# Patient Record
Sex: MALE | Race: WHITE | NOT HISPANIC OR LATINO | Employment: UNEMPLOYED | URBAN - METROPOLITAN AREA
[De-identification: names, ages, dates, MRNs, and addresses within clinical notes are randomized per-mention and may not be internally consistent; named-entity substitution may affect disease eponyms.]

---

## 2023-04-01 ENCOUNTER — APPOINTMENT (OUTPATIENT)
Dept: RADIOLOGY | Facility: HOSPITAL | Age: 33
End: 2023-04-01
Attending: EMERGENCY MEDICINE
Payer: COMMERCIAL

## 2023-04-01 ENCOUNTER — HOSPITAL ENCOUNTER (EMERGENCY)
Facility: HOSPITAL | Age: 33
Discharge: HOME | End: 2023-04-01
Attending: EMERGENCY MEDICINE | Admitting: EMERGENCY MEDICINE
Payer: COMMERCIAL

## 2023-04-01 VITALS
SYSTOLIC BLOOD PRESSURE: 139 MMHG | HEIGHT: 69 IN | WEIGHT: 155 LBS | BODY MASS INDEX: 22.96 KG/M2 | TEMPERATURE: 97.8 F | OXYGEN SATURATION: 98 % | DIASTOLIC BLOOD PRESSURE: 79 MMHG | RESPIRATION RATE: 18 BRPM | HEART RATE: 100 BPM

## 2023-04-01 DIAGNOSIS — F10.920 ALCOHOLIC INTOXICATION WITHOUT COMPLICATION (H): ICD-10-CM

## 2023-04-01 DIAGNOSIS — R45.851 SUICIDAL IDEATION: ICD-10-CM

## 2023-04-01 DIAGNOSIS — S61.212A LACERATION OF RIGHT MIDDLE FINGER WITHOUT FOREIGN BODY WITHOUT DAMAGE TO NAIL, INITIAL ENCOUNTER: ICD-10-CM

## 2023-04-01 LAB
ETHANOL SERPL-MCNC: 0.15 G/DL
HOLD SPECIMEN: NORMAL

## 2023-04-01 PROCEDURE — 12001 RPR S/N/AX/GEN/TRNK 2.5CM/<: CPT

## 2023-04-01 PROCEDURE — 90715 TDAP VACCINE 7 YRS/> IM: CPT | Performed by: EMERGENCY MEDICINE

## 2023-04-01 PROCEDURE — 36415 COLL VENOUS BLD VENIPUNCTURE: CPT | Performed by: EMERGENCY MEDICINE

## 2023-04-01 PROCEDURE — 73130 X-RAY EXAM OF HAND: CPT | Mod: RT

## 2023-04-01 PROCEDURE — 99285 EMERGENCY DEPT VISIT HI MDM: CPT | Mod: 25

## 2023-04-01 PROCEDURE — 90791 PSYCH DIAGNOSTIC EVALUATION: CPT

## 2023-04-01 PROCEDURE — 90471 IMMUNIZATION ADMIN: CPT | Performed by: EMERGENCY MEDICINE

## 2023-04-01 PROCEDURE — 82077 ASSAY SPEC XCP UR&BREATH IA: CPT | Performed by: EMERGENCY MEDICINE

## 2023-04-01 PROCEDURE — 250N000011 HC RX IP 250 OP 636: Performed by: EMERGENCY MEDICINE

## 2023-04-01 RX ADMIN — CLOSTRIDIUM TETANI TOXOID ANTIGEN (FORMALDEHYDE INACTIVATED), CORYNEBACTERIUM DIPHTHERIAE TOXOID ANTIGEN (FORMALDEHYDE INACTIVATED), BORDETELLA PERTUSSIS TOXOID ANTIGEN (GLUTARALDEHYDE INACTIVATED), BORDETELLA PERTUSSIS FILAMENTOUS HEMAGGLUTININ ANTIGEN (FORMALDEHYDE INACTIVATED), BORDETELLA PERTUSSIS PERTACTIN ANTIGEN, AND BORDETELLA PERTUSSIS FIMBRIAE 2/3 ANTIGEN 0.5 ML: 5; 2; 2.5; 5; 3; 5 INJECTION, SUSPENSION INTRAMUSCULAR at 12:04

## 2023-04-01 ASSESSMENT — COLUMBIA-SUICIDE SEVERITY RATING SCALE - C-SSRS
2. HAVE YOU ACTUALLY HAD ANY THOUGHTS OF KILLING YOURSELF?: YES
1. HAVE YOU WISHED YOU WERE DEAD OR WISHED YOU COULD GO TO SLEEP AND NOT WAKE UP?: YES
REASONS FOR IDEATION LIFETIME: EQUALLY TO GET ATTENTION, REVENGE, OR A REACTION FROM OTHERS AND TO END/STOP THE PAIN
6. HAVE YOU EVER DONE ANYTHING, STARTED TO DO ANYTHING, OR PREPARED TO DO ANYTHING TO END YOUR LIFE?: NO
5. HAVE YOU STARTED TO WORK OUT OR WORKED OUT THE DETAILS OF HOW TO KILL YOURSELF? DO YOU INTEND TO CARRY OUT THIS PLAN?: NO
3. HAVE YOU BEEN THINKING ABOUT HOW YOU MIGHT KILL YOURSELF?: NO
2. HAVE YOU ACTUALLY HAD ANY THOUGHTS OF KILLING YOURSELF?: YES
1. IN THE PAST MONTH, HAVE YOU WISHED YOU WERE DEAD OR WISHED YOU COULD GO TO SLEEP AND NOT WAKE UP?: YES
REASONS FOR IDEATION PAST MONTH: EQUALLY TO GET ATTENTION, REVENGE, OR A REACTION FROM OTHERS AND TO END/STOP THE PAIN
4. HAVE YOU HAD THESE THOUGHTS AND HAD SOME INTENTION OF ACTING ON THEM?: NO
ATTEMPT LIFETIME: NO
TOTAL  NUMBER OF ABORTED OR SELF INTERRUPTED ATTEMPTS LIFETIME: NO
TOTAL  NUMBER OF INTERRUPTED ATTEMPTS LIFETIME: NO
4. HAVE YOU HAD THESE THOUGHTS AND HAD SOME INTENTION OF ACTING ON THEM?: NO
5. HAVE YOU STARTED TO WORK OUT OR WORKED OUT THE DETAILS OF HOW TO KILL YOURSELF? DO YOU INTEND TO CARRY OUT THIS PLAN?: NO

## 2023-04-01 ASSESSMENT — ENCOUNTER SYMPTOMS
HALLUCINATIONS: 0
WOUND: 1

## 2023-04-01 ASSESSMENT — ACTIVITIES OF DAILY LIVING (ADL)
ADLS_ACUITY_SCORE: 35
ADLS_ACUITY_SCORE: 35

## 2023-04-01 NOTE — ED PROVIDER NOTES
"EMERGENCY DEPARTMENT ENCOUNTER      NAME: Alejandro Barnes  AGE: 33 year old male  YOB: 1990  MRN: [id number]  EVALUATION DATE & TIME: 4/1/2023  7:21 AM    PCP: No primary care provider on file.    ED PROVIDER: Rosie Jiménez M.D.      CHIEF COMPLAINT     Chief Complaint   Patient presents with     Suicidal     With no specific plan         FINAL IMPRESSION:     1. Suicidal ideation    2. Alcoholic intoxication without complication (H)    3. Laceration of right middle finger without foreign body without damage to nail, initial encounter          MEDICAL DECISION MAKING:       Pertinent Labs & Imaging studies reviewed. (See chart for details)    33 year old male presents to the Emergency Department for evaluation of suicidal cut to the right hand    ED Course as of 04/01/23 1156   Sat Apr 01, 2023   0806 Mr. Barnes is a 33-year-old male who presents via EMS.  Per EMS and patient's report he was drinking at his father's house the father remove the alcohol bottle and \"\" he started freaking out.  He he hi a statue shield and cut his fingers.  He voiced desire to kill himself.   0807 On evaluation patient is awake and alert.  Denies being suicidal denies having a plan.  States he had thought about it in the past but never carry on.  Denies being evaluated for this in the past.  Does have a history of depression does not take any medications.   0807 On evaluation patient admitted to abusing alcohol but does not think he has an problem.  Denies previous history of alcohol withdrawal or DTs.   0808 On examination well-appearing cooperative and pleasant makes eye contact.  No signs of head trauma.  Cardiopulmonary abnormalities.  2 superficial abrasions middle and fifth finger  Full range of motion.   0808 Plan to x-ray for foreign body fracture.  We will clean the area.  Will Dermabond.  Will obtain collaborating history from father as per DEC   0955 X-ray no foreign body no fracture.   0955 Called father " x2 with no answer.   1118 Spoke with DEC.  She was able to talk to patient and father.  Patient is not actively suicidal.  Resources were given to him.  Father feels comfortable with him going home.  Firearms are locked.  I spoke with father.  He voiced frustration about his son's alcohol drinking.  He feels comfortable with patient going back home.  He cannot come pick patient up because of this note.  States he will be glad for patient to go back home.   1143 Patient has remained suicidal.  Contracts for safety.  Unfortunately when I ask him if he will follow-up with the resources provided for his drinking he said he may.  He does contract in case he does feel feeling suicidal and he will call 911.  Father unable to pick him up therefore we will discharge him with hospital provider transfer.   1144 Clinical impression and decision making  33-year-old male presents here via EMS after he voiced suicidal deviation to his father.  Per EMS patient and the father the father remove an alcohol bottle patient became upset hit his hand on a statue and voiced that he wanted to kill himself.   1145 On arrival patient is not actively suicidal.  He said he just became upset.  States he had thoughts in the past but is not actively suicidal and does not have a plan.   1145 Elevated alcohol level initial evaluation.  Patient noted to be clinically sober after discharge.   1145 Tdap updated.  X-ray reveals no fracture or foreign body.  Laceration repaired with Dermabond.   1145 Patient contracted for safety but is not sure if he is going to follow-up for alcohol abuse.  This is unfortunate because this is what he needs.  I recommended admission to detox center patient refuses.   1146 Discharge clinically sober in stable condition.       Vital Signs: Reviewed  EKG: None  Imaging: An x-ray no acute  Home Meds: Reviewed  ED meds/abx: Tdap, Dermabond  Fluids: Oral    Labs  none      Medical Decision Making    History:    Supplemental  history from: Documented in chart, if applicable    External Record(s) reviewed: Documented in chart, if applicable.    Work Up:    Chart documentation includes differential considered and any EKGs or imaging independently interpreted by provider, where specified.    In additional to work up documented, I considered the following work up: Documented in chart, if applicable.    External consultation:    Discussion of management with another provider: Documented in chart, if applicable    Complicating factors:    Care impacted by chronic illness: Smoking / Nicotine Use    Care affected by social determinants of health: Alcohol Abuse and/or Recreational Drug Use    Disposition considerations: Discharge. No recommendations on prescription strength medication(s). I recommended admission, but the patient declined.          Review of Previous Records  No prior records for review      Consults  DEC Consult      ED COURSE     7:48 AM Introduced myself to the patient, obtained history of present illness, and performed initial physical exam at this time. PPE: Provider wore gloves, N95 mask, eye protection, surgical cap.     9:24 AM Called the patient's father, Heath. He did not answer the phone at this time.     9:54 AM Attempted to call the patient's father again, left a message.     10:31 AM Performed laceration repair with Dermabond.    10:40 AM Spoke with DEC. They were able to speak with the patient and his father today.    11:11 AM Called the patient's father to discuss patient discharge and pick-up.    11:19 AM Discussed discharge with the patient, he is agreeable with this plan.    At the conclusion of the encounter I discussed the results of all of the tests and the disposition. The questions were answered. The patient and his father acknowledged understanding and was agreeable with the care plan.           MEDICATIONS GIVEN IN THE EMERGENCY:     Medications   Tdap (tetanus-diphtheria-acell pertussis) (ADACEL)  "injection 0.5 mL (has no administration in time range)       NEW PRESCRIPTIONS STARTED AT TODAY'S ER VISIT     New Prescriptions    No medications on file          =================================================================    HPI     Patient information was obtained from: the patient    Use of : N/A      Alejandro Jesús Barnes is a 33 year old male with a verbal medical history of depression, who presents by EMS for evaluation of suicidal ideation.     The patient lives at home with his father. He was drinking last night and went to take a shower, when he came back his father had taken the bottle of alcohol. This caused the patient to \"freak out,\" kicking a door and punching a gambino that they have in their home. This injured his right third and ring fingers in the process. He notes that he said something regarding suicide, but denies wanting to kill himself right now. He endorses suicidal ideation, but has no plan. He has a history of suicidal ideation, but has never attempted. He denies having assistance for suicidal plans. Patient endorses depression, but is not currently on medication for this. No history of medical problems. Last tetanus was over 10 years ago, he is open to updating it today. The patient states that he feels safe at home. He denies current suicidal ideation, homicidal ideation, hallucinations, and any other symptoms at this time.     The patient notes that he does not think he has a problem with drinking, but notes that he has trouble stopping once he starts. He usually drinks whiskey and drinks about 0.5 L at a time. No history of alcohol withdrawal.       REVIEW OF SYSTEMS   Review of Systems   Skin: Positive for wound (lacerations of right hand third and fourth finger).   Psychiatric/Behavioral: Positive for suicidal ideas (resolved). Negative for hallucinations.        Negative for homicidal ideation   All other systems reviewed and are negative.       PAST MEDICAL HISTORY: " "  No past medical history on file.    PAST SURGICAL HISTORY:   No past surgical history on file.      CURRENT MEDICATIONS:   No current outpatient medications on file.       ALLERGIES:   No Known Allergies    FAMILY HISTORY:   No family history on file.    SOCIAL HISTORY:     Social History     Socioeconomic History     Marital status: Single       VITALS:   /79 (BP Location: Left arm)   Pulse 100   Temp 97.8  F (36.6  C) (Oral)   Resp 18   Ht 1.753 m (5' 9\")   Wt 70.3 kg (155 lb)   SpO2 98%   BMI 22.89 kg/m      PHYSICAL EXAM     Physical Exam  Vitals and nursing note reviewed. Exam conducted with a chaperone present.   Constitutional:       General: He is not in acute distress.     Appearance: Normal appearance. He is not ill-appearing, toxic-appearing or diaphoretic.   Musculoskeletal:        Arms:    Skin:     Capillary Refill: Capillary refill takes less than 2 seconds.   Neurological:      Mental Status: He is alert and oriented to person, place, and time.   Psychiatric:         Mood and Affect: Mood normal.         Behavior: Behavior normal.         Thought Content: Thought content normal.         Judgment: Judgment normal.      Comments: Not suicidal not homicidal admits to abusing alcohol but declines having a problem with a declines help         Physical Exam   Constitutional: Well-appearing     Head: Atraumatic.     Nose: Nose normal.     Mouth/Throat: Oropharynx is clear and moist.     Eyes: EOM are normal. Pupils are equal, round, and reactive to light.     Ears: bilateral pearly white TMs    Neck: Normal range of motion. Neck supple.     Cardiovascular: Normal rate, regular rhythm and normal heart sounds.      Pulmonary/Chest: Normal effort and breath sounds normal.     Abdominal: soft nontender    Musculoskeletal: Normal range of motion.     Neurological: No deficits    Lymphatics: No edema    : N/A    Skin: Skin is warm and dry. 2.5 cm lacerations of MCP of right third and ring " fingers    Psychiatric: Normal mood and affect. Behavior is normal.  Denies feeling suicidal denies feeling homicidal denies hallucinating admits to drinking alcohol.  Denies having problem with alcoholism.      LAB:     All pertinent labs reviewed and interpreted.  Labs Ordered and Resulted from Time of ED Arrival to Time of ED Departure   ETHYL ALCOHOL LEVEL - Abnormal       Result Value    Alcohol ethyl 0.15 (*)         RADIOLOGY:     Reviewed all pertinent imaging. Please see official radiology report.  XR Hand Right G/E 3 Views   Final Result   IMPRESSION: Normal joint spaces and alignment. No fracture.           EKG:       I have independently reviewed and interpreted the EKG(s) documented above.      PROCEDURES:     Procedures    PROCEDURE: Laceration Repair   INDICATIONS: Laceration   PROCEDURE PROVIDER: Dr Rosie Jiménez   SITE: Right third and ring fingers   TYPE/SIZE: simple, clean and no foreign body visualized  2.5 cm (total length)   FUNCTIONAL ASSESSMENT: Distal sensation, circulation and motor intact   MEDICATION:    PREPARATION: irrigation with Normal saline   DEBRIDEMENT: no debridement           I, Traci Hylton, am serving as a scribe to document services personally performed by Dr. Jiménez based on my observation and the provider's statements to me. I, Rosie Jiménez MD attest that Traci Hylton is acting in a scribe capacity, has observed my performance of the services and has documented them in accordance with my direction.    Rosie Jiménez M.D.  Emergency Medicine  [facility] EMERGENCY DEPARTMENT  [address] [place] [id number] [phone]  Dept: [phone]       Rosie Jiménez MD  04/01/23 2367
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
## 2023-04-01 NOTE — DISCHARGE INSTRUCTIONS
Read and follow the discharge instructions.      Avoid alcohol this is not good for you.  Follow-up with the resources provided.    I am doing a referral for primary care doctor.  They should be calling you for follow-up    Keep your wound clean and dry.  The glue will dissolve on its own.    Return immediately or call 911 if you feel like hurting yourself or hurting others or any other concerns.      Plan  You were seen in the ED due to alcohol intoxication and suicidal statements. We strongly recommend follow up - it can be very helpful to keep talking to someone about how you are feeling, even if you're not sure what you need or what can change.    You declined to schedule any follow up appointments at this time. Please call Behavioral Access Scheduling office at [phone] to set up a virtual Diagnostic Assessment appointment if you change your mind and want to work on your alcohol use and mental health.       If I am feeling unsafe or I am in a crisis, I will:   Contact my established care providers   Call the National Suicide Prevention Lifeline: 988  Go to the nearest emergency room   Call 911     Warning signs that I or other people might notice when a crisis is developing for me: suicidal statements, threats to dad.     Things I am able to do on my own to cope or help me feel better: limit substance use. Working/independence/finances will help you meet your goals     Things that I am able to do with others to cope or help me better: consider trying therapy. If you are not able to work and don't have a good mood - that is a sign to talk to someone!     Things I can use or do for distraction: video games, being outdoors     Changes I can make to support my mental health and wellness: structure each day and abstain from alcohol use     People in my life that I can ask for help: Your dad is supportive, provides safe place to live.     Your [facility] has a mental health crisis team you can call 24/7: Jarek  "[facility] Crisis  [phone] (adults)  [phone] (children)          Crisis Lines  Crisis Text Line  Text 487805  You will be connected with a trained live crisis counselor to provide support.    Por arianne, texto  JACOB a 005728 o texto a 442-AYUDAME en WhatsApp    [employer] [place] [employer] (LGBTQ Youth Crisis Line)  4.693.917.1163  text START to 757-510      InstantQ  Fast Tracker  Linking people to mental health and substance use disorder resources  [place] [employer].Qinti     [place] Mental Health Warm Line  Peer to peer support  Monday thru Saturday, 12 pm to 10 pm  481.035.4605 or 8.845.406.4363  Text \"Support\" to 20716    National [place] on Mental Illness (IDANIA)  845.540.1861 or 1.888.IDANIA.HELPS      Mental Health Apps  My3  https://Boosket.org/    VirtualHopeBox  https://[facility]/apps/virtual-hope-box/      Additional Information  Today you were seen by a licensed mental health professional through Triage and Transition services, Behavioral Healthcare Providers ([facility])  for a crisis assessment in the Emergency Department at [facility].  It is recommended that you follow up with your established providers (psychiatrist, mental health therapist, and/or primary care doctor - as relevant) as soon as possible. Coordinators from [facility] will be calling you in the next 24-48 hours to ensure that you have the resources you need.  You can also contact [facility] coordinators directly at [phone]. You may have been scheduled for or offered an appointment with a mental health provider. [facility] maintains an extensive network of licensed behavioral health providers to connect patients with the services they need.  We do not charge providers a fee to participate in our referral network.  We match patients with providers based on a patient's specific needs, insurance coverage, and location.  Our first effort will be to refer you to a provider within your care system, and will utilize providers " outside your care system as needed.
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
